# Patient Record
Sex: FEMALE | Race: BLACK OR AFRICAN AMERICAN | NOT HISPANIC OR LATINO | Employment: UNEMPLOYED | URBAN - METROPOLITAN AREA
[De-identification: names, ages, dates, MRNs, and addresses within clinical notes are randomized per-mention and may not be internally consistent; named-entity substitution may affect disease eponyms.]

---

## 2023-11-22 ENCOUNTER — HOSPITAL ENCOUNTER (EMERGENCY)
Facility: HOSPITAL | Age: 38
Discharge: HOME | End: 2023-11-22
Payer: COMMERCIAL

## 2023-11-22 VITALS
HEART RATE: 98 BPM | BODY MASS INDEX: 26.77 KG/M2 | HEIGHT: 70 IN | TEMPERATURE: 98.8 F | SYSTOLIC BLOOD PRESSURE: 148 MMHG | OXYGEN SATURATION: 98 % | DIASTOLIC BLOOD PRESSURE: 84 MMHG | RESPIRATION RATE: 16 BRPM | WEIGHT: 187 LBS

## 2023-11-22 DIAGNOSIS — Z20.822 ENCOUNTER FOR LABORATORY TESTING FOR COVID-19 VIRUS: Primary | ICD-10-CM

## 2023-11-22 LAB — SARS-COV-2 RNA RESP QL NAA+PROBE: DETECTED

## 2023-11-22 PROCEDURE — 99285 EMERGENCY DEPT VISIT HI MDM: CPT

## 2023-11-22 PROCEDURE — 99283 EMERGENCY DEPT VISIT LOW MDM: CPT

## 2023-11-22 PROCEDURE — 99284 EMERGENCY DEPT VISIT MOD MDM: CPT | Performed by: NURSE PRACTITIONER

## 2023-11-22 PROCEDURE — 87635 SARS-COV-2 COVID-19 AMP PRB: CPT | Performed by: EMERGENCY MEDICINE

## 2023-11-22 ASSESSMENT — ENCOUNTER SYMPTOMS: HEADACHES: 1

## 2023-11-22 ASSESSMENT — COLUMBIA-SUICIDE SEVERITY RATING SCALE - C-SSRS
1. IN THE PAST MONTH, HAVE YOU WISHED YOU WERE DEAD OR WISHED YOU COULD GO TO SLEEP AND NOT WAKE UP?: NO
2. HAVE YOU ACTUALLY HAD ANY THOUGHTS OF KILLING YOURSELF?: NO
6. HAVE YOU EVER DONE ANYTHING, STARTED TO DO ANYTHING, OR PREPARED TO DO ANYTHING TO END YOUR LIFE?: NO

## 2023-11-22 NOTE — ED PROVIDER NOTES
"Emergency Department Encounter  [facility] EMERGENCY MEDICINE    Patient: Tanisha Andrade  MRN: [id number]  : 1985  Date of Evaluation: 2023  ED Provider: DENVER Higuera      Chief Complaint       Chief Complaint   Patient presents with    Other     COVID Test, No Symptoms         Limitations to History: none  Historian: patient  Records reviewed: EMR inpatient and outpatient notes, Care Everywhere    This is a 38-year-old female who presents to the emergency room requesting a COVID test.  Patient states that her boyfriend tested positive for COVID.  Patient states that she has a \"slight headache.\"  Patient initially took Tylenol which did not help her headache but then took her migraine medication.  Patient states that she does not have any symptoms at this time.  Denies any cough, runny nose, abdominal pain, nausea, vomiting or diarrhea.    PMH: migraines, asthma, HPV, HSV, \"mental health\"  PSH: Denies  Allergies: Reviewed per EMR  Social HX: + smoker, occasional alcohol use, denies any drug use.  Family HX: No family history pertinent to current presenting problem  Medications: Reviewed per EMR      ROS:     Review of Systems   Neurological:  Positive for headaches.     14 systems reviewed and otherwise acutely negative except as in the [place].        Past History     Past Medical History:   Diagnosis Date    Other conditions influencing health status     No significant past medical history    Personal history of other diseases of the nervous system and sense organs     History of migraine    Personal history of other diseases of the respiratory system     History of asthma    Personal history of other infectious and parasitic diseases     History of herpes simplex infection     No past surgical history on file.      Medications/Allergies     Previous Medications    No medications on file     Allergies   Allergen Reactions    Oxycodone Other and Anaphylaxis     Previously " "tolerated HYDROcodone- acetaminophen (Norco)    Oxycodone-Acetaminophen Anaphylaxis     Previously tolerated HYDROcodone- acetaminophen (Norco)    Prochlorperazine Other and Hives     anxiety and restlessness    Strawberry Anaphylaxis    Latex Itching    Morphine Itching     Previously tolerated HYDROcodone- acetaminophen (Norco)        Physical Exam       ED Triage Vitals [11/22/23 1736]   Temp Heart Rate Resp BP   37.1 °C (98.8 °F) 98 16 148/84      SpO2 Temp src Heart Rate Source Patient Position   98 % -- Monitor Sitting      BP Location FiO2 (%)     Right arm --       Physical Exam:    Appearance: Alert, oriented , cooperative,  in no acute distress. Well nourished & well hydrated.    Skin: Intact,  dry skin, no lesions, rash, petechiae or purpura.     ENT: Hearing grossly intact.     Neck: Supple, without meningismus.     Pulmonary: Clear bilaterally with good chest wall excursion. No rales, rhonchi or wheezing. No accessory muscle use or stridor.    Cardiac: Normal S1, S2 without murmur, rub, gallop or extrasystole.     Abdomen: Soft, nontender, active bowel sounds.  No palpable organomegaly.  No rebound or guarding.      Musculoskeletal: Full range of motion. no pain, edema, or deformity. Pulses full and equal. No cyanosis, clubbing, or edema.    Psychiatric: Appropriate mood and affect.       Diagnostics   Labs:  No results found for this or any previous visit (from the past 24 hour(s)).   Radiographs:  No orders to display         Assessment   In brief, Tanisha Andrade is a 38 y.o. female who presented to the emergency department for a covid test.          ED Course/MDM   Visit Vitals  /84 (BP Location: Right arm, Patient Position: Sitting)   Pulse 98   Temp 37.1 °C (98.8 °F)   Resp 16   Ht 1.778 m (5' 10\")   Wt 84.8 kg (187 lb)   SpO2 98%   BMI 26.83 kg/m²   BSA 2.05 m²       Medications - No data to display    Patient remained stable while in the emergency department. Previous outpatient and ED " records were reviewed. Outside records were reviewed. Vital signs were stable. Asymptomatic at this time.  COVID test was obtained, pending results at this time.  Patient was discharged home and was advised to follow-up with her primary care doctor return the emergency room with worsening symptoms.    Final Impression      1. Encounter for laboratory testing for COVID-19 virus          DISPOSITION  Disposition: Discharged home    Comment: Please note this report has been produced using speech recognition software and may contain errors related to that system including errors in grammar, punctuation, and spelling, as well as words and phrases that may be inappropriate.  If there are any questions or concerns please feel free to contact the dictating provider for clarification.    Mimi Reyes, EDWAR-EDWAR Shi-YU  11/22/23 2740

## 2023-11-24 ENCOUNTER — TELEPHONE (OUTPATIENT)
Dept: PHARMACY | Facility: HOSPITAL | Age: 38
End: 2023-11-24
Payer: COMMERCIAL

## 2023-11-24 NOTE — PROGRESS NOTES
EDPD Note: Rapid Result Review    Reviewed Ms. Tanisha Andrade 's chart regarding a positive COVID19 PCR result that was taken during their recent emergency room visit. The patient was told about these results prior to leaving the emergency department. Therefore, patient was contacted and given proper education. She reports that she was informed of positive result but was not given further educations, so we dicussed signs and symptoms in addition to isolation procedure.     Latest Reference Range & Units 11/22/23 18:28   Coronavirus 2019, PCR Not Detected  Detected !   !: Data is abnormal    No further follow up needed from EDPD Team.     Shania Anne, JayD